# Patient Record
Sex: MALE | Race: WHITE | NOT HISPANIC OR LATINO | Employment: FULL TIME | URBAN - METROPOLITAN AREA
[De-identification: names, ages, dates, MRNs, and addresses within clinical notes are randomized per-mention and may not be internally consistent; named-entity substitution may affect disease eponyms.]

---

## 2017-06-13 ENCOUNTER — ALLSCRIPTS OFFICE VISIT (OUTPATIENT)
Dept: OTHER | Facility: OTHER | Age: 72
End: 2017-06-13

## 2017-07-21 RX ORDER — ASPIRIN 81 MG/1
81 TABLET ORAL DAILY
COMMUNITY

## 2017-07-25 ENCOUNTER — ANESTHESIA EVENT (OUTPATIENT)
Dept: GASTROENTEROLOGY | Facility: MEDICAL CENTER | Age: 72
End: 2017-07-25
Payer: COMMERCIAL

## 2017-07-26 ENCOUNTER — HOSPITAL ENCOUNTER (OUTPATIENT)
Facility: MEDICAL CENTER | Age: 72
Setting detail: OUTPATIENT SURGERY
Discharge: HOME | End: 2017-07-26
Attending: INTERNAL MEDICINE | Admitting: INTERNAL MEDICINE
Payer: COMMERCIAL

## 2017-07-26 ENCOUNTER — GENERIC CONVERSION - ENCOUNTER (OUTPATIENT)
Dept: GASTROENTEROLOGY | Facility: MEDICAL CENTER | Age: 72
End: 2017-07-26

## 2017-07-26 ENCOUNTER — ANESTHESIA (OUTPATIENT)
Dept: GASTROENTEROLOGY | Facility: MEDICAL CENTER | Age: 72
End: 2017-07-26
Payer: COMMERCIAL

## 2017-07-26 VITALS
HEIGHT: 69 IN | OXYGEN SATURATION: 98 % | DIASTOLIC BLOOD PRESSURE: 84 MMHG | HEART RATE: 59 BPM | TEMPERATURE: 96.5 F | SYSTOLIC BLOOD PRESSURE: 129 MMHG | RESPIRATION RATE: 16 BRPM | WEIGHT: 180 LBS | BODY MASS INDEX: 26.66 KG/M2

## 2017-07-26 DIAGNOSIS — Z11.59 ENCOUNTER FOR SCREENING FOR OTHER VIRAL DISEASES: ICD-10-CM

## 2017-07-26 PROCEDURE — 88305 TISSUE EXAM BY PATHOLOGIST: CPT | Performed by: INTERNAL MEDICINE

## 2017-07-26 RX ORDER — PROPOFOL 10 MG/ML
INJECTION, EMULSION INTRAVENOUS AS NEEDED
Status: DISCONTINUED | OUTPATIENT
Start: 2017-07-26 | End: 2017-07-26 | Stop reason: SURG

## 2017-07-26 RX ORDER — ATORVASTATIN CALCIUM 10 MG/1
10 TABLET, FILM COATED ORAL DAILY
COMMUNITY

## 2017-07-26 RX ORDER — SODIUM CHLORIDE 9 MG/ML
125 INJECTION, SOLUTION INTRAVENOUS CONTINUOUS
Status: DISCONTINUED | OUTPATIENT
Start: 2017-07-26 | End: 2017-07-26 | Stop reason: HOSPADM

## 2017-07-26 RX ADMIN — PROPOFOL 50 MG: 10 INJECTION, EMULSION INTRAVENOUS at 10:26

## 2017-07-26 RX ADMIN — PROPOFOL 100 MG: 10 INJECTION, EMULSION INTRAVENOUS at 10:22

## 2017-07-26 RX ADMIN — SODIUM CHLORIDE 125 ML/HR: 0.9 INJECTION, SOLUTION INTRAVENOUS at 10:07

## 2017-07-26 RX ADMIN — PROPOFOL 50 MG: 10 INJECTION, EMULSION INTRAVENOUS at 10:38

## 2017-07-26 RX ADMIN — PROPOFOL 50 MG: 10 INJECTION, EMULSION INTRAVENOUS at 10:30

## 2017-07-26 RX ADMIN — PROPOFOL 50 MG: 10 INJECTION, EMULSION INTRAVENOUS at 10:40

## 2017-07-31 ENCOUNTER — GENERIC CONVERSION - ENCOUNTER (OUTPATIENT)
Dept: OTHER | Facility: OTHER | Age: 72
End: 2017-07-31

## 2018-01-10 NOTE — RESULT NOTES
Message  MRI brain was stable changes  MRI thoracic spine showed a faint increased T2/inversion recovery signal identified within the upper thoracic cord which is incompletely evaluated, and recommended a dedicated repeat of the thoracic spine  Diffuse cervical spondylitic degenerative changes, moderate canal stenosis at C3-C4 and C4-C5, multilevel foraminal narrowing most prominent at C4-C5 on the right and C3-C4 on the right  Patient is already scheduled for MRI of the thoracic spine for tomorrow, followed up      Results/Data  Results    * MRI CERVICAL SPINE W WO CONTRAST   MRI CERVICAL SPINE W WO CONTRAST: This is a summary report  The complete  report is available in the patient's medical record  If you cannot access the medical  record, please contact the sending organization for a detailed fax or copy  MRI CERVICAL SPINE WITH AND WITHOUT CONTRAST     INDICATION:  Patient currently being treated for transverse myelitis  G04 89      COMPARISON:  None     TECHNIQUE:  Sagittal T1, sagittal T2, sagittal inversion recovery, axial 2D merge and  axial T2  Sagittal T1 and axial T1 postcontrast      8 mL of Gadavist was administered without immediate consequence  IMAGE QUALITY:  Diagnostic  FINDINGS:     ALIGNMENT:  Normal alignment of the cervical spine  No compression fracture  No  subluxation  No scoliosis  MARROW SIGNAL:  Endplate marrow degenerative change scattered within the cervical  endplates, Modic type II fatty marrow changes at C3-4 with Modic type I  edema noted at C4-5  Fatty marrow changes noted at C6-7 as well  CERVICAL AND VISUALIZED UPPER THORACIC CORD:  Cervical cord is normal in  signal   No discrete cervical cord lesion noted  No cervical cord compression  The  previously identified lesions in the upper thoracic cord on prior MRI of the thoracic spine    dated 10/15/2015 are below the scope of axial imaging    Sagittal inversion recovery and  T2-weighted imaging demonstrates faint areas of increased T2 signal within the lower  thoracic cord are less well visualized  PREVERTEBRAL AND PARASPINAL SOFT TISSUES:  Prevertebral and paraspinal soft  tissues are unremarkable  VISUALIZED POSTERIOR FOSSA:  The visualized posterior fossa demonstrates no  abnormal signal      CERVICAL DISC SPACES:          C2-C3:  Normal      C3-C4:  Mild degenerative disc disease  Mild uncinate joint hypertrophic degenerative  change and facet degenerative change, right greater than left  Moderate canal stenosis  and right foraminal narrowing  C4-C5:  Disc desiccation and loss of disc height with annular bulging and uncinate joint  hypertrophic degenerative change bilaterally, right greater than left  Mild to moderate  canal stenosis  Mild left and moderately severe right foraminal narrowing  C5-C6:  Mild degenerative disc disease  No focal disc herniation  Mild canal stenosis  and foraminal narrowing without cord or nerve impingement  C6-C7:  Disc desiccation and loss of disc height  No discrete disc herniation, canal  stenosis  Mild left foraminal narrowing  C7-T1:  Mild right facet hypertrophic degenerative change  No disc herniation or canal  stenosis  Mild right foraminal narrowing  UPPER THORACIC DISC SPACES:  Normal      POSTCONTRAST IMAGING:  Normal        IMPRESSION:     Faint increased T2/inversion recovery signal identified within the upper thoracic CORD  incompletely evaluated on this examination of the cervical spine  Previous MRI of  the thoracic spine dated 10/15/2015, consider dedicated repeat examination of the    thoracic spine  Diffuse cervical spondylitic degenerative change  Moderate canal stenosis at C3-4 and  C4-5  Multilevel foraminal narrowing most pronounced at C4-5 on the right and C3-4 on  the right         Workstation performed: [id number]     Signed by:   Hina Gar,    2/23/16   * MRI BRAIN W WO CONTRAST   MRI BRAIN W WO CONTRAST: This is a summary report  The complete report is  available in the patient's medical record  If you cannot access the medical record, please  contact the sending organization for a detailed fax or copy  MRI BRAIN WITH AND WITHOUT CONTRAST     INDICATION:  Transverse myelopathy, G04 89, low back pain right leg weakness and  right upper quadrant pain     COMPARISON:  MR 10/14/2015     TECHNIQUE:   Sagittal T1, axial T2, axial FLAIR, axial T1, axial Gradient, axial diffusion  Sagittal, axial and coronal T1 postcontrast   Axial BRAVO post contrast      8 mL of Gadavist was injected intravenously without immediate consequence  IMAGE QUALITY:   Diagnostic  FINDINGS:     BRAIN PARENCHYMA:  No acute disease  No intracranial mass or mass effect  No  acute ischemia  No indication of demyelinating disease  Scattered stable white matter  changes likely reflecting sequela of chronic small vessel disease  Minor,    age-appropriate volume loss  Incidental left paramedian frontal developmental venous  anomaly without associated cavernous angioma  Postcontrast imaging of the brain  demonstrates no abnormal enhancement  VENTRICLES:  Normal      SELLA AND PITUITARY GLAND:  Normal      ORBITS:  Normal      PARANASAL SINUSES:  Normal      VASCULATURE:  Evaluation of the major intracranial vasculature demonstrates  appropriate flow voids  CALVARIUM AND SKULL BASE:  Normal      EXTRACRANIAL SOFT TISSUES:  Normal        IMPRESSION:     Stable MR appearance of the brain  No acute disease  No evidence for demyelinating  disease  Workstation performed: [id number]     Signed by:    Tan Ricardo MD   2/23/16     Signatures   Electronically signed by : Mk Pond MD; Feb 24 2016 11:54AM EST                       (Author)

## 2018-01-14 VITALS
WEIGHT: 191.13 LBS | HEART RATE: 57 BPM | DIASTOLIC BLOOD PRESSURE: 74 MMHG | HEIGHT: 69 IN | OXYGEN SATURATION: 97 % | SYSTOLIC BLOOD PRESSURE: 126 MMHG | BODY MASS INDEX: 28.31 KG/M2

## 2018-01-16 NOTE — MISCELLANEOUS
Dear Dr Gita Chambers,    The polyps I removed from Mr Melo Aponte were precancerous  Based on the fact that there were three of them, I recommend a repeat colonoscopy in 3 years    He will be placed on my recall list          Sincerely,        Cinthya Morales DO      Electronically signed by:Lizzy King DO  Jul 31 2017 12:13PM EST

## 2023-07-28 ENCOUNTER — HOSPITAL ENCOUNTER (OUTPATIENT)
Dept: RADIOLOGY | Facility: HOSPITAL | Age: 78
End: 2023-07-28
Payer: COMMERCIAL

## 2023-07-28 DIAGNOSIS — G89.29 CHRONIC MIDLINE LOW BACK PAIN WITHOUT SCIATICA: ICD-10-CM

## 2023-07-28 DIAGNOSIS — G89.29 OTHER CHRONIC PAIN: ICD-10-CM

## 2023-07-28 DIAGNOSIS — M54.50 CHRONIC MIDLINE LOW BACK PAIN WITHOUT SCIATICA: ICD-10-CM

## 2023-07-28 PROCEDURE — 72110 X-RAY EXAM L-2 SPINE 4/>VWS: CPT

## 2023-09-12 ENCOUNTER — OFFICE VISIT (OUTPATIENT)
Dept: PHYSICAL THERAPY | Facility: CLINIC | Age: 78
End: 2023-09-12
Payer: COMMERCIAL

## 2023-09-12 DIAGNOSIS — M54.50 CHRONIC BILATERAL LOW BACK PAIN WITHOUT SCIATICA: Primary | ICD-10-CM

## 2023-09-12 DIAGNOSIS — G89.29 CHRONIC BILATERAL LOW BACK PAIN WITHOUT SCIATICA: Primary | ICD-10-CM

## 2023-09-12 PROCEDURE — 97161 PT EVAL LOW COMPLEX 20 MIN: CPT | Performed by: PHYSICAL THERAPIST

## 2023-09-12 PROCEDURE — 97110 THERAPEUTIC EXERCISES: CPT | Performed by: PHYSICAL THERAPIST

## 2023-09-12 NOTE — PROGRESS NOTES
PT Evaluation     Today's date: 2023  Patient name: Brenda Ngo  : 1945  MRN: [id number]  Referring provider: JONO Tellez  Dx:   Encounter Diagnosis     ICD-10-CM    1. Chronic bilateral low back pain without sciatica  M54.50     G89.29                      Assessment  Assessment details: Radha Dumont is a 70 yo male with chronic low back pain presenting with limited lumbar extension ROM, tight bilateral hip flexors, decreased strength of bilateral hips all planes, and the listed functional limitations. He reports decreased pain after repeated lumbar extensions, indicating pain is mechanical in nature. He is an excellent candidate for OPPT to address the above impairments and optimize functional status. Functional limitations: limited standing tolerance, difficulty standing up straight  Goals  6 weeks  1. Increase B hip strength to 5/5 to improve core stability. 2. Tolerate standing 30-60 minutes without onset of pain to allow resuming PLOF. 3. Normalize BLE flexibility to promote good body mechanics during daily tasks. 4. Independent with HEP at discharge. 5. Decrease max pain to no more than 3/10 to increase functional activity tolerance. Plan  Plan details: Provided with and reviewed initial written HEP. Reviewed physical exam findings and plan of care. All questions answered to patient's satisfaction. Patient would benefit from: PT eval and skilled physical therapy  Planned modality interventions: low level laser therapy  Planned therapy interventions: manual therapy, neuromuscular re-education, patient education, therapeutic activities, therapeutic exercise and home exercise program  Frequency: 1x week  Duration in weeks: 6  Treatment plan discussed with: patient        Subjective Evaluation    History of Present Illness  Mechanism of injury: Patient has been having low back pain for the last 5 years ever since being diagnosed with transverse myelitis.  He describes feeling a "belt of tingling" around his low back/waist. Pain is worse upon first waking and with prolonged standing. Denies any symptoms into LE, B/B changes, or history of trauma. He arrives today via direct access for OPPT evaluation. Patient Goals  Patient goals for therapy: decreased pain, increased motion and increased strength    Pain  Current pain rating: 3  At best pain ratin  At worst pain ratin  Location: low back    Exercise history: golf, walking      Diagnostic Tests  X-ray: abnormal (multilevel spondylytic changes)  Treatments  No previous or current treatments        Objective     Active Range of Motion     Lumbar   Flexion:  WFL  Extension:  Restriction level: moderate  Left lateral flexion:  WFL  Right lateral flexion:  Restriction level: minimal    Passive Range of Motion     Additional Passive Range of Motion Details  Min restricted hamstrings, piriformis bilat  Sig restricted hip flexors bilat  Mod restricted quad bilat    Joint Play     Hypomobile: L1, L2, L3, L4, L5 and S1   Mechanical Assessment    Cervical      Thoracic      Lumbar    Standing extension: repeated movements  Pain location: no change  Lying extension: repeated movements  Pain intensity: better  Right sidegliding: repeated movements  Pain location: no change    Strength/Myotome Testing     Lumbar   Left   Heel walk: normal  Toe walk: normal    Right   Heel walk: normal  Toe walk: normal    Left Hip   Planes of Motion   Flexion: 4  Extension: 4  Abduction: 4    Right Hip   Planes of Motion   Flexion: 5  Extension: 4  Abduction: 4    Left Knee   Flexion: 5  Extension: 5    Right Knee   Flexion: 5  Extension: 5    Muscle Activation     Additional Muscle Activation Details  Fair recruitment, poor endurance of TA    Tests     Lumbar   Negative prone instability  and sacral spring . Left   Negative crossed SLR and passive SLR. Right   Negative crossed SLR and passive SLR.        Flowsheet Rows    [employer] Most Recent Value   PT/OT G-Codes    Current Score 69   Projected Score 71             Precautions: standard      Manuals 9/12            Lumbar PA                                                    Neuro Re-Ed             TA             TA + bridge             TA + bent knee fall out             TA + mat SLRx3                                                    Ther Ex             REIL             Piriformis str             Hamstring str w/ strap             Prone quad str w/ strap             Hip flexor str                                       PT ed HEP rev KT            Ther Activity             TA + treadmill                          Gait Training                                       Modalities

## 2023-09-12 NOTE — LETTER
2023    Tr Montgomery, [address] [address]    Patient: Lubna Mcleod   YOB: 1945   Date of Visit: 2023     Encounter Diagnosis     ICD-10-CM    1. Chronic bilateral low back pain without sciatica  M54.50     G89.29           Dear Dr. Kiki Albright: Thank you for your recent referral of Lubna Mcleod. Please review the attached evaluation summary from Curtis's recent visit. Please verify that you agree with the plan of care by signing the attached order. If you have any questions or concerns, please do not hesitate to call. I sincerely appreciate the opportunity to share in the care of one of your patients and hope to have another opportunity to work with you in the near future. Sincerely,    García Duncan, PT      Referring Provider:      I certify that I have read the below Plan of Care and certify the need for these services furnished under this plan of treatment while under my care. Tr MontgomeryGuillermo [address] [place] [id number]  Via Fax: [phone]          PT Evaluation     Today's date: 2023  Patient name: Lubna Mcleod  : 1945  MRN: [id number]  Referring provider: JONO Harris  Dx:   Encounter Diagnosis     ICD-10-CM    1. Chronic bilateral low back pain without sciatica  M54.50     G89.29                      Assessment  Assessment details: Marcelino Zee is a 70 yo male with chronic low back pain presenting with limited lumbar extension ROM, tight bilateral hip flexors, decreased strength of bilateral hips all planes, and the listed functional limitations. He reports decreased pain after repeated lumbar extensions, indicating pain is mechanical in nature. He is an excellent candidate for OPPT to address the above impairments and optimize functional status.    Functional limitations: limited standing tolerance, difficulty standing up straight  Goals  6 weeks  1. Increase B hip strength to 5/5 to improve core stability. 2. Tolerate standing 30-60 minutes without onset of pain to allow resuming PLOF. 3. Normalize BLE flexibility to promote good body mechanics during daily tasks. 4. Independent with HEP at discharge. 5. Decrease max pain to no more than 3/10 to increase functional activity tolerance. Plan  Plan details: Provided with and reviewed initial written HEP. Reviewed physical exam findings and plan of care. All questions answered to patient's satisfaction. Patient would benefit from: PT eval and skilled physical therapy  Planned modality interventions: low level laser therapy  Planned therapy interventions: manual therapy, neuromuscular re-education, patient education, therapeutic activities, therapeutic exercise and home exercise program  Frequency: 1x week  Duration in weeks: 6  Treatment plan discussed with: patient        Subjective Evaluation    History of Present Illness  Mechanism of injury: Patient has been having low back pain for the last 5 years ever since being diagnosed with transverse myelitis. He describes feeling a "belt of tingling" around his low back/waist. Pain is worse upon first waking and with prolonged standing. Denies any symptoms into LE, B/B changes, or history of trauma. He arrives today via direct access for OPPT evaluation.    Patient Goals  Patient goals for therapy: decreased pain, increased motion and increased strength    Pain  Current pain rating: 3  At best pain ratin  At worst pain ratin  Location: low back    Exercise history: golf, walking      Diagnostic Tests  X-ray: abnormal (multilevel spondylytic changes)  Treatments  No previous or current treatments        Objective     Active Range of Motion     Lumbar   Flexion:  WFL  Extension:  Restriction level: moderate  Left lateral flexion:  WFL  Right lateral flexion:  Restriction level: minimal    Passive Range of Motion Additional Passive Range of Motion Details  Min restricted hamstrings, piriformis bilat  Sig restricted hip flexors bilat  Mod restricted quad bilat    Joint Play     Hypomobile: L1, L2, L3, L4, L5 and S1   Mechanical Assessment    Cervical      Thoracic      Lumbar    Standing extension: repeated movements  Pain location: no change  Lying extension: repeated movements  Pain intensity: better  Right sidegliding: repeated movements  Pain location: no change    Strength/Myotome Testing     Lumbar   Left   Heel walk: normal  Toe walk: normal    Right   Heel walk: normal  Toe walk: normal    Left Hip   Planes of Motion   Flexion: 4  Extension: 4  Abduction: 4    Right Hip   Planes of Motion   Flexion: 5  Extension: 4  Abduction: 4    Left Knee   Flexion: 5  Extension: 5    Right Knee   Flexion: 5  Extension: 5    Muscle Activation     Additional Muscle Activation Details  Fair recruitment, poor endurance of TA    Tests     Lumbar   Negative prone instability  and sacral spring . Left   Negative crossed SLR and passive SLR. Right   Negative crossed SLR and passive SLR.        Flowsheet Rows    Flowsheet Row Most Recent Value   PT/OT G-Codes    Current Score 69   Projected Score 71            Precautions: standard      Manuals 9/12            Lumbar PA                                                    Neuro Re-Ed             TA             TA + bridge             TA + bent knee fall out             TA + mat SLRx3                                                    Ther Ex             REIL             Piriformis str             Hamstring str w/ strap             Prone quad str w/ strap             Hip flexor str                                       PT ed HEP rev KT            Ther Activity             TA + treadmill                          Gait Training                                       Modalities

## 2023-09-19 ENCOUNTER — OFFICE VISIT (OUTPATIENT)
Dept: PHYSICAL THERAPY | Facility: CLINIC | Age: 78
End: 2023-09-19
Payer: COMMERCIAL

## 2023-09-19 DIAGNOSIS — G89.29 CHRONIC BILATERAL LOW BACK PAIN WITHOUT SCIATICA: Primary | ICD-10-CM

## 2023-09-19 DIAGNOSIS — M54.50 CHRONIC BILATERAL LOW BACK PAIN WITHOUT SCIATICA: Primary | ICD-10-CM

## 2023-09-19 PROCEDURE — 97530 THERAPEUTIC ACTIVITIES: CPT | Performed by: PHYSICAL THERAPIST

## 2023-09-19 PROCEDURE — 97140 MANUAL THERAPY 1/> REGIONS: CPT | Performed by: PHYSICAL THERAPIST

## 2023-09-19 PROCEDURE — 97110 THERAPEUTIC EXERCISES: CPT | Performed by: PHYSICAL THERAPIST

## 2023-09-19 NOTE — PROGRESS NOTES
Daily Note     Today's date: 2023  Patient name: Anna Tyler  : 1945  MRN: [id number]  Referring provider: JONO Ramos  Dx:   Encounter Diagnosis     ICD-10-CM    1. Chronic bilateral low back pain without sciatica  M54.50     G89.29                      Subjective: Pt reports he's been doing his exercises which seem to help sometimes but not others. Pain 3/10 upon arrival to PT. Objective: See treatment diary below      Assessment: Tolerated treatment well. Patient exhibited good technique with therapeutic exercises and would benefit from continued PT. Updated written HEP. Plan: Continue per plan of care. Precautions: standard      Manuals            Lumbar PA  KT           Manual lumbar traction  30"x2           Hip Flexor str  30" ea                        Neuro Re-Ed             TA             TA + bridge             TA + bent knee fall out             TA + mat SLRx3                                                    Ther Ex             REIL  10x           Piriformis str  30"x3 ea           Hamstring str w/ strap  30"x3 ea           Prone quad str w/ strap  30"x3 ea           Hip flexor str  Std w/ stool 30"x3 ea                                     PT ed HEP rev KT            Ther Activity             TA + treadmill  8' 2. 3mph                        Gait Training                                       Modalities

## 2023-09-26 ENCOUNTER — OFFICE VISIT (OUTPATIENT)
Dept: PHYSICAL THERAPY | Facility: CLINIC | Age: 78
End: 2023-09-26
Payer: COMMERCIAL

## 2023-09-26 DIAGNOSIS — G89.29 CHRONIC BILATERAL LOW BACK PAIN WITHOUT SCIATICA: Primary | ICD-10-CM

## 2023-09-26 DIAGNOSIS — M54.50 CHRONIC BILATERAL LOW BACK PAIN WITHOUT SCIATICA: Primary | ICD-10-CM

## 2023-09-26 PROCEDURE — 97110 THERAPEUTIC EXERCISES: CPT | Performed by: PHYSICAL THERAPIST

## 2023-09-26 PROCEDURE — 97112 NEUROMUSCULAR REEDUCATION: CPT | Performed by: PHYSICAL THERAPIST

## 2023-09-26 PROCEDURE — 97140 MANUAL THERAPY 1/> REGIONS: CPT | Performed by: PHYSICAL THERAPIST

## 2023-09-26 NOTE — PROGRESS NOTES
Daily Note     Today's date: 2023  Patient name: Sukhi Leyva  : 1945  MRN: [id number]  Referring provider: JONO Fernandez  Dx:   Encounter Diagnosis     ICD-10-CM    1. Chronic bilateral low back pain without sciatica  M54.50     G89.29                      Subjective: Pt reports not noticing any significant changes so far. Objective: See treatment diary below      Assessment: Tolerated treatment well. Patient exhibited good technique with therapeutic exercises and would benefit from continued PT. Min change noted with lumbar extension ROM. Plan: Continue per plan of care. Precautions: standard      Manuals           Lumbar PA  KT KT          Manual lumbar traction  30"x2 30"x2          Hip Flexor str  30" ea 30" ea                       Neuro Re-Ed             TA   5"x15          TA + bridge   5"x15          TA + bent knee fall out   15x          TA + mat SLRx3   15x ea                                                 Ther Ex             REIL  10x 10x          Piriformis str  30"x3 ea 30"x3 ea          Hamstring str w/ strap  30"x3 ea 30"x3 ea          Prone quad str w/ strap  30"x3 ea 30"x3 ea          Hip flexor str  Std w/ stool 30"x3 ea                                     PT ed HEP rev KT            Ther Activity             TA + treadmill  8' 2. 3mph 10' 2. 3mph                       Gait Training                                       Modalities

## 2023-10-03 ENCOUNTER — OFFICE VISIT (OUTPATIENT)
Dept: PHYSICAL THERAPY | Facility: CLINIC | Age: 78
End: 2023-10-03
Payer: COMMERCIAL

## 2023-10-03 DIAGNOSIS — G89.29 CHRONIC BILATERAL LOW BACK PAIN WITHOUT SCIATICA: Primary | ICD-10-CM

## 2023-10-03 DIAGNOSIS — M54.50 CHRONIC BILATERAL LOW BACK PAIN WITHOUT SCIATICA: Primary | ICD-10-CM

## 2023-10-03 PROCEDURE — 97140 MANUAL THERAPY 1/> REGIONS: CPT | Performed by: PHYSICAL THERAPIST

## 2023-10-03 PROCEDURE — 97530 THERAPEUTIC ACTIVITIES: CPT | Performed by: PHYSICAL THERAPIST

## 2023-10-03 PROCEDURE — 97110 THERAPEUTIC EXERCISES: CPT | Performed by: PHYSICAL THERAPIST

## 2023-10-03 NOTE — PROGRESS NOTES
Daily Note     Today's date: 10/3/2023  Patient name: Katey Corral  : 1945  MRN: [id number]  Referring provider: JONO Oneal  Dx:   Encounter Diagnosis     ICD-10-CM    1. Chronic bilateral low back pain without sciatica  M54.50     G89.29                      Subjective: Pt reports noticing no changes since starting PT. Still has good days and bad days. He reports being diligent about doing his HEP. Objective: See treatment diary below  HEP updated as follows:  Access Code: [email or web address]  URL: [email or web address]  Date: 10/03/2023  Prepared by: Ericka Vasquez. Curtis    Exercises  - [employer] Up  - 3 x daily - 7 x weekly - 10 reps  - Supine Active Straight Leg Raise  - 1 x daily - 7 x weekly - 3 sets - 10 reps  - Sidelying Hip Abduction  - 1 x daily - 7 x weekly - 3 sets - 10 reps  - Prone Hip Extension  - 1 x daily - 7 x weekly - 3 sets - 10 reps  - Supine Bridge  - 1 x daily - 7 x weekly - 20 reps - 5 hold  - Hooklying Transversus Abdominis Palpation  - 1 x daily - 7 x weekly - 20 reps - 5 hold  - Supine Transversus Abdominis Bracing with Double Leg Fallout  - 1 x daily - 7 x weekly - 20 reps  - Prone Quadriceps Stretch with Strap  - 1 x daily - 7 x weekly - 3 reps - 30 hold  - Supine Quadriceps Stretch with Strap on Table  - 2 x daily - 7 x weekly - 3 reps - 30 hold  - Supine Figure 4 Piriformis Stretch with Leg Extension  - 1 x daily - 7 x weekly - 3 reps - 30 hold  - Supine Hamstring Stretch with Strap  - 1 x daily - 7 x weekly - 3 reps - 30 hold  - Standing Hip Flexor Stretch with Foot Elevated  - 1 x daily - 7 x weekly - 3 reps - 30 hold  - Child's Pose with Sidebending  - 1 x daily - 7 x weekly - 3 reps - 20-30 hold  - Supine Lower Trunk Rotation  - 1 x daily - 7 x weekly - 3 reps - 20-30 hold  - Standing Quadratus Lumborum Stretch with Doorway  - 1 x daily - 7 x weekly - 3 reps - 20-30 hold      Assessment: Tolerated treatment well.  Patient reports decreased stiffness after STM and feels the new stretches target his most stiff area. Plan: Continue per plan of care. Precautions: standard      Manuals 9/12 9/19 9/26 10/3         Lumbar PA  KT KT KT         Manual lumbar traction  30"x2 30"x2          Hip Flexor str  30" ea 30" ea          STM B QL    KT         B L/S SB rot str    KT         Neuro Re-Ed             TA   5"x15          TA + bridge   5"x15          TA + bent knee fall out   15x          TA + mat SLRx3   15x ea                                                 Ther Ex             REIL  10x 10x          Piriformis str  30"x3 ea 30"x3 ea          Hamstring str w/ strap  30"x3 ea 30"x3 ea          Prone quad str w/ strap  30"x3 ea 30"x3 ea          Hip flexor str  Std w/ stool 30"x3 ea           Child's pose + sidebending    20"x3         LTR    20"x3         Std QL str at wall    20"x3         PT ed HEP rev KT            Ther Activity             TA + treadmill  8' 2. 3mph 10' 2. 3mph 10' 5% 2. 3mph                      Gait Training                                       Modalities

## 2023-10-10 ENCOUNTER — OFFICE VISIT (OUTPATIENT)
Dept: PHYSICAL THERAPY | Facility: CLINIC | Age: 78
End: 2023-10-10
Payer: COMMERCIAL

## 2023-10-10 DIAGNOSIS — M54.50 CHRONIC BILATERAL LOW BACK PAIN WITHOUT SCIATICA: Primary | ICD-10-CM

## 2023-10-10 DIAGNOSIS — G89.29 CHRONIC BILATERAL LOW BACK PAIN WITHOUT SCIATICA: Primary | ICD-10-CM

## 2023-10-10 PROCEDURE — 97530 THERAPEUTIC ACTIVITIES: CPT | Performed by: PHYSICAL THERAPIST

## 2023-10-10 PROCEDURE — 97110 THERAPEUTIC EXERCISES: CPT | Performed by: PHYSICAL THERAPIST

## 2023-10-10 PROCEDURE — 97140 MANUAL THERAPY 1/> REGIONS: CPT | Performed by: PHYSICAL THERAPIST

## 2023-10-10 NOTE — PROGRESS NOTES
Daily Note     Today's date: 10/10/2023  Patient name: Jarred Klein  : 1945  MRN: [id number]  Referring provider: JONO Herrera  Dx:   Encounter Diagnosis     ICD-10-CM    1. Chronic bilateral low back pain without sciatica  M54.50     G89.29                      Subjective: Pt reports feeling very temporary relief after last session but no changes with the new stretches. Objective: See treatment diary below      Assessment: Tolerated treatment well. Patient exhibited good technique with therapeutic exercises. Relief after STM but no change with stretching or lying in prone extension. Plan: Hold PT up to 30 days due to patient's upcoming vacations. Follow up afterwards if needed. Precautions: standard      Manuals 9/12 9/19 9/26 10/3 10/10        Lumbar PA  KT KT KT KT        Manual lumbar traction  30"x2 30"x2          Hip Flexor str  30" ea 30" ea          STM B QL    KT KT        B L/S SB rot str    KT         Neuro Re-Ed             TA   5"x15          TA + bridge   5"x15          TA + bent knee fall out   15x          TA + mat SLRx3   15x ea                                                 Ther Ex             REIL  10x 10x  10x        Piriformis str  30"x3 ea 30"x3 ea          Hamstring str w/ strap  30"x3 ea 30"x3 ea          Prone quad str w/ strap  30"x3 ea 30"x3 ea          Hip flexor str  Std w/ stool 30"x3 ea           Prone lying elevated head of bed     3'        Child's pose + sidebending    20"x3 20"x3        LTR    20"x3 20"x3        Std QL str at wall    20"x3         PT ed HEP rev KT            Ther Activity             TA + treadmill  8' 2. 3mph 10' 2. 3mph 10' 5% 2. 3mph 10' 5% 2. 8mph                     Gait Training                                       Modalities